# Patient Record
Sex: FEMALE | Race: WHITE | NOT HISPANIC OR LATINO | Employment: OTHER | URBAN - METROPOLITAN AREA
[De-identification: names, ages, dates, MRNs, and addresses within clinical notes are randomized per-mention and may not be internally consistent; named-entity substitution may affect disease eponyms.]

---

## 2017-01-11 NOTE — PATIENT DISCUSSION
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 Advised patient to call our office with decreased vision or increased distortion. AREDS 2 VITS. AMSLER GRID. F/U WITH DR. Okeefe [facility] THIS MONTH  1 YEAR EXAM WITH DR. Karlie Hayes.

## 2019-04-22 ENCOUNTER — NEW PATIENT COMPREHENSIVE (OUTPATIENT)
Dept: URBAN - METROPOLITAN AREA CLINIC 43 | Facility: CLINIC | Age: 84
End: 2019-04-22

## 2019-04-22 DIAGNOSIS — H52.4: ICD-10-CM

## 2019-04-22 DIAGNOSIS — H52.03: ICD-10-CM

## 2019-04-22 PROCEDURE — 92004 COMPRE OPH EXAM NEW PT 1/>: CPT

## 2019-04-22 PROCEDURE — 92015 DETERMINE REFRACTIVE STATE: CPT

## 2019-04-22 ASSESSMENT — VISUAL ACUITY
OD_PH: 20/50
OD_CC: J6
OS_CC: J3
OD_SC: 20/60-1
OS_CC: 20/30-2
OS_SC: J12
OD_CC: 20/70
OD_SC: J12
OS_BAT: >20/400
OS_SC: 20/40
OD_BAT: >20/400

## 2019-04-22 ASSESSMENT — TONOMETRY
OS_IOP_MMHG: 14
OD_IOP_MMHG: 14

## 2019-11-12 ENCOUNTER — CATARACT CONSULT (OUTPATIENT)
Dept: URBAN - METROPOLITAN AREA CLINIC 43 | Facility: CLINIC | Age: 84
End: 2019-11-12

## 2019-11-12 DIAGNOSIS — H25.89: ICD-10-CM

## 2019-11-12 DIAGNOSIS — H25.812: ICD-10-CM

## 2019-11-12 DIAGNOSIS — H18.51: ICD-10-CM

## 2019-11-12 DIAGNOSIS — H21.563: ICD-10-CM

## 2019-11-12 DIAGNOSIS — H25.811: ICD-10-CM

## 2019-11-12 DIAGNOSIS — H04.123: ICD-10-CM

## 2019-11-12 DIAGNOSIS — H35.30: ICD-10-CM

## 2019-11-12 DIAGNOSIS — H43.813: ICD-10-CM

## 2019-11-12 DIAGNOSIS — H21.541: ICD-10-CM

## 2019-11-12 PROCEDURE — 92136TC INTERFEROMETRY - TECHNICAL COMPONENT

## 2019-11-12 PROCEDURE — V2799PMN IMPRIMIS PRED-MOXI-NEPAF 5ML

## 2019-11-12 PROCEDURE — 92025-2 CORNEAL TOPOGRAPHY, PT

## 2019-11-12 PROCEDURE — 92014 COMPRE OPH EXAM EST PT 1/>: CPT

## 2019-11-12 PROCEDURE — 92286 ANT SGM IMG I&R SPECLR MIC: CPT

## 2019-11-12 PROCEDURE — 92134 CPTRZ OPH DX IMG PST SGM RTA: CPT

## 2019-11-12 ASSESSMENT — VISUAL ACUITY
OD_SC: J12
OD_CC: 20/80+1
OS_CC: J8
OS_SC: J12
OS_SC: 20/80
OD_SC: 20/200
OD_BAT: 20/200
OS_CC: 20/50+2
OD_CC: J8
OS_BAT: 20/200
OD_RAM: 20/20
OS_AM: 20/20

## 2019-11-12 ASSESSMENT — TONOMETRY
OS_IOP_MMHG: 16
OD_IOP_MMHG: 15

## 2019-11-15 ENCOUNTER — SURGERY/PROCEDURE (OUTPATIENT)
Dept: URBAN - METROPOLITAN AREA CLINIC 43 | Facility: CLINIC | Age: 84
End: 2019-11-15

## 2019-11-15 ENCOUNTER — PRE-OP/H&P (OUTPATIENT)
Dept: URBAN - METROPOLITAN AREA CLINIC 39 | Facility: CLINIC | Age: 84
End: 2019-11-15

## 2019-11-15 ENCOUNTER — POST-OP (OUTPATIENT)
Dept: URBAN - METROPOLITAN AREA CLINIC 39 | Facility: CLINIC | Age: 84
End: 2019-11-15

## 2019-11-15 DIAGNOSIS — H21.541: ICD-10-CM

## 2019-11-15 DIAGNOSIS — H25.811: ICD-10-CM

## 2019-11-15 DIAGNOSIS — Z96.1: ICD-10-CM

## 2019-11-15 PROCEDURE — 99211T TECH SERVICE

## 2019-11-15 PROCEDURE — 65772LRI LRI DURING CAT SX

## 2019-11-15 PROCEDURE — 66999LNSR LENSAR LASER FOR CAT SX

## 2019-11-15 PROCEDURE — 66982CV COMPLEX CATARACT WITH IOL, CUSTOM VISION

## 2019-11-15 ASSESSMENT — VISUAL ACUITY: OD_SC: 20/200

## 2019-11-15 ASSESSMENT — TONOMETRY: OD_IOP_MMHG: 10

## 2019-11-18 ENCOUNTER — POST OP/EVAL OF SECOND EYE (OUTPATIENT)
Dept: URBAN - METROPOLITAN AREA CLINIC 43 | Facility: CLINIC | Age: 84
End: 2019-11-18

## 2019-11-18 DIAGNOSIS — Z96.1: ICD-10-CM

## 2019-11-18 DIAGNOSIS — H25.812: ICD-10-CM

## 2019-11-18 PROCEDURE — 99024 POSTOP FOLLOW-UP VISIT: CPT

## 2019-11-18 ASSESSMENT — TONOMETRY
OS_IOP_MMHG: 14
OD_IOP_MMHG: 14

## 2019-11-18 ASSESSMENT — VISUAL ACUITY
OS_PH: 20/40
OS_SC: 20/60
OD_SC: 20/25+2
OS_SC: J12
OS_BAT: 20/200
OD_SC: J12

## 2019-11-20 ENCOUNTER — SURGERY/PROCEDURE (OUTPATIENT)
Dept: URBAN - METROPOLITAN AREA CLINIC 43 | Facility: CLINIC | Age: 84
End: 2019-11-20

## 2019-11-20 ENCOUNTER — PRE-OP/H&P (OUTPATIENT)
Dept: URBAN - METROPOLITAN AREA CLINIC 39 | Facility: CLINIC | Age: 84
End: 2019-11-20

## 2019-11-20 DIAGNOSIS — H25.812: ICD-10-CM

## 2019-11-20 DIAGNOSIS — Z96.1: ICD-10-CM

## 2019-11-20 PROCEDURE — 65772LRI LRI DURING CAT SX

## 2019-11-20 PROCEDURE — 99211T TECH SERVICE

## 2019-11-20 PROCEDURE — 66999LNSR LENSAR LASER FOR CAT SX

## 2019-11-20 PROCEDURE — 66984CV REMOVE CATARACT, INSERT LENS, CUSTOM VISION

## 2019-11-20 ASSESSMENT — VISUAL ACUITY
OS_SC: J12
OD_SC: J12
OS_SC: 20/60
OD_SC: 20/25-1

## 2019-11-20 ASSESSMENT — TONOMETRY
OD_IOP_MMHG: 18
OS_IOP_MMHG: 18

## 2019-11-21 ENCOUNTER — CATARACT POST-OP 1-DAY (OUTPATIENT)
Dept: URBAN - METROPOLITAN AREA CLINIC 43 | Facility: CLINIC | Age: 84
End: 2019-11-21

## 2019-11-21 DIAGNOSIS — Z96.1: ICD-10-CM

## 2019-11-21 PROCEDURE — 99024 POSTOP FOLLOW-UP VISIT: CPT

## 2019-11-21 ASSESSMENT — TONOMETRY
OS_IOP_MMHG: 12
OD_IOP_MMHG: 13

## 2019-11-21 ASSESSMENT — VISUAL ACUITY
OS_SC: 20/25
OD_SC: 20/25-

## 2019-12-11 ENCOUNTER — POST-OP (OUTPATIENT)
Dept: URBAN - METROPOLITAN AREA CLINIC 43 | Facility: CLINIC | Age: 84
End: 2019-12-11

## 2019-12-11 DIAGNOSIS — Z96.1: ICD-10-CM

## 2019-12-11 PROCEDURE — 99024 POSTOP FOLLOW-UP VISIT: CPT

## 2019-12-11 ASSESSMENT — VISUAL ACUITY
OS_SC: J8
OD_SC: 20/25-3
OS_SC: 20/25-1
OD_SC: J12

## 2019-12-11 ASSESSMENT — TONOMETRY
OS_IOP_MMHG: 14
OD_IOP_MMHG: 13

## 2020-02-07 ENCOUNTER — EST. PATIENT EMERGENCY (OUTPATIENT)
Dept: URBAN - METROPOLITAN AREA CLINIC 43 | Facility: CLINIC | Age: 85
End: 2020-02-07

## 2020-02-07 DIAGNOSIS — Z96.1: ICD-10-CM

## 2020-02-07 PROCEDURE — 99024 POSTOP FOLLOW-UP VISIT: CPT

## 2020-02-07 ASSESSMENT — VISUAL ACUITY
OS_SC: 20/25+1
OD_SC: 20/40

## 2020-02-07 ASSESSMENT — TONOMETRY
OD_IOP_MMHG: 15
OS_IOP_MMHG: 14

## 2020-04-29 ENCOUNTER — EST. PATIENT EMERGENCY (OUTPATIENT)
Dept: URBAN - METROPOLITAN AREA CLINIC 43 | Facility: CLINIC | Age: 85
End: 2020-04-29

## 2020-04-29 DIAGNOSIS — H52.203: ICD-10-CM

## 2020-04-29 DIAGNOSIS — Z96.1: ICD-10-CM

## 2020-04-29 DIAGNOSIS — H04.123: ICD-10-CM

## 2020-04-29 PROCEDURE — 92012 INTRM OPH EXAM EST PATIENT: CPT

## 2020-04-29 RX ORDER — DOXYCYCLINE 50 MG/1
1 CAPSULE ORAL ONCE A DAY
Start: 2020-04-29

## 2020-04-29 ASSESSMENT — TONOMETRY
OS_IOP_MMHG: 12
OD_IOP_MMHG: 10

## 2020-04-29 ASSESSMENT — VISUAL ACUITY
OS_BAT: 20/80
OS_SC: J4
OD_SC: 20/40+2
OD_SC: J12
OD_BAT: 20/80

## 2022-02-03 ENCOUNTER — COMPREHENSIVE EXAM (OUTPATIENT)
Dept: URBAN - METROPOLITAN AREA CLINIC 43 | Facility: CLINIC | Age: 87
End: 2022-02-03

## 2022-02-03 DIAGNOSIS — H52.203: ICD-10-CM

## 2022-02-03 DIAGNOSIS — H52.4: ICD-10-CM

## 2022-02-03 PROCEDURE — 92015 DETERMINE REFRACTIVE STATE: CPT

## 2022-02-03 PROCEDURE — 92014 COMPRE OPH EXAM EST PT 1/>: CPT

## 2022-02-03 ASSESSMENT — VISUAL ACUITY
OS_SC: J6
OS_SC: 20/30-2
OD_BAT: 20/80
OD_SC: J12
OS_CC: J2
OD_CC: J2
OD_SC: 20/25-2
OS_BAT: 20/80

## 2022-02-03 ASSESSMENT — TONOMETRY
OD_IOP_MMHG: 16
OS_IOP_MMHG: 16

## 2022-09-14 ENCOUNTER — EMERGENCY VISIT (OUTPATIENT)
Dept: URBAN - METROPOLITAN AREA CLINIC 43 | Facility: CLINIC | Age: 87
End: 2022-09-14

## 2022-09-14 DIAGNOSIS — H43.813: ICD-10-CM

## 2022-09-14 PROCEDURE — 92012 INTRM OPH EXAM EST PATIENT: CPT

## 2022-09-14 ASSESSMENT — VISUAL ACUITY
OU_SC: 20/25-2
OS_SC: 20/30+2
OD_SC: 20/30+2

## 2022-09-14 ASSESSMENT — TONOMETRY
OS_IOP_MMHG: 12
OD_IOP_MMHG: 12

## 2023-11-01 ENCOUNTER — EMERGENCY VISIT (OUTPATIENT)
Dept: URBAN - METROPOLITAN AREA CLINIC 43 | Facility: CLINIC | Age: 88
End: 2023-11-01

## 2023-11-01 DIAGNOSIS — H43.813: ICD-10-CM

## 2023-11-01 PROCEDURE — 92012 INTRM OPH EXAM EST PATIENT: CPT

## 2023-11-01 ASSESSMENT — TONOMETRY
OD_IOP_MMHG: 15
OS_IOP_MMHG: 14

## 2023-11-01 ASSESSMENT — VISUAL ACUITY
OS_SC: 20/30
OD_SC: 20/25-1